# Patient Record
Sex: MALE | Race: WHITE | NOT HISPANIC OR LATINO | Employment: FULL TIME
[De-identification: names, ages, dates, MRNs, and addresses within clinical notes are randomized per-mention and may not be internally consistent; named-entity substitution may affect disease eponyms.]

---

## 2018-12-03 ENCOUNTER — TRANSCRIBE ORDERS (OUTPATIENT)
Dept: SCHEDULING | Age: 48
End: 2018-12-03

## 2018-12-03 DIAGNOSIS — N39.8 OTHER SPECIFIED DISORDERS OF URINARY SYSTEM: Primary | ICD-10-CM

## 2019-03-14 ENCOUNTER — TRANSCRIBE ORDERS (OUTPATIENT)
Dept: SCHEDULING | Age: 49
End: 2019-03-14

## 2019-03-14 DIAGNOSIS — M54.2 CERVICALGIA: ICD-10-CM

## 2019-03-14 DIAGNOSIS — M25.551 PAIN IN RIGHT HIP: Primary | ICD-10-CM

## 2019-03-14 DIAGNOSIS — M54.12 RADICULOPATHY OF CERVICAL REGION: ICD-10-CM

## 2019-03-18 ENCOUNTER — HOSPITAL ENCOUNTER (OUTPATIENT)
Dept: RADIOLOGY | Age: 49
Discharge: HOME | End: 2019-03-18
Attending: FAMILY MEDICINE
Payer: COMMERCIAL

## 2019-03-18 DIAGNOSIS — M54.2 CERVICALGIA: ICD-10-CM

## 2019-03-18 DIAGNOSIS — M25.551 PAIN IN RIGHT HIP: ICD-10-CM

## 2019-03-18 DIAGNOSIS — M54.12 RADICULOPATHY OF CERVICAL REGION: ICD-10-CM

## 2019-03-18 PROCEDURE — 73502 X-RAY EXAM HIP UNI 2-3 VIEWS: CPT | Mod: RT

## 2019-10-13 ENCOUNTER — HOSPITAL ENCOUNTER (EMERGENCY)
Facility: HOSPITAL | Age: 49
Discharge: HOME | End: 2019-10-13
Attending: EMERGENCY MEDICINE
Payer: COMMERCIAL

## 2019-10-13 ENCOUNTER — APPOINTMENT (EMERGENCY)
Dept: RADIOLOGY | Facility: HOSPITAL | Age: 49
End: 2019-10-13
Attending: EMERGENCY MEDICINE
Payer: COMMERCIAL

## 2019-10-13 VITALS
HEIGHT: 77 IN | TEMPERATURE: 98.3 F | WEIGHT: 195 LBS | BODY MASS INDEX: 23.02 KG/M2 | DIASTOLIC BLOOD PRESSURE: 77 MMHG | RESPIRATION RATE: 16 BRPM | HEART RATE: 76 BPM | SYSTOLIC BLOOD PRESSURE: 136 MMHG | OXYGEN SATURATION: 97 %
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
## 2019-10-13 DIAGNOSIS — R07.89 CHEST WALL PAIN: ICD-10-CM

## 2019-10-13 DIAGNOSIS — M79.632 LEFT FOREARM PAIN: ICD-10-CM

## 2019-10-13 PROCEDURE — 99283 EMERGENCY DEPT VISIT LOW MDM: CPT | Mod: 25

## 2019-10-13 PROCEDURE — 73080 X-RAY EXAM OF ELBOW: CPT | Mod: LT

## 2019-10-13 PROCEDURE — 73090 X-RAY EXAM OF FOREARM: CPT | Mod: LT

## 2019-10-13 PROCEDURE — 71101 X-RAY EXAM UNILAT RIBS/CHEST: CPT | Mod: LT

## 2019-10-13 ASSESSMENT — ENCOUNTER SYMPTOMS
NAUSEA: 0
FEVER: 0
FACIAL SWELLING: 0
ARTHRALGIAS: 1
EYE DISCHARGE: 0
MYALGIAS: 1
DIFFICULTY URINATING: 0
VOMITING: 0
EYE REDNESS: 0
NUMBNESS: 0
HEADACHES: 0
RHINORRHEA: 0
COUGH: 0
SHORTNESS OF BREATH: 0
DIARRHEA: 0

## 2019-10-13 NOTE — ED ATTESTATION NOTE
"-----------------------------------------------------------  ED Attending Note.  10/13/2019, 1:05 PM    I supervised care provided by the PA Benita). We have discussed the case. I have reviewed their note and agree with the plan of treatment. I personally interviewed the patient and examined the patient.    Sher Celaya is a 48 y.o. male with the following History reviewed. No pertinent past medical history., There is no problem list on file for this patient.   and History reviewed. No pertinent surgical history. who comes to the ED today with   Chief Complaint   Patient presents with   • Upper Extremity Issue     fall yesterday       PMH as above c/o     PHYSICAL EXAM  Visit Vitals  /77   Pulse 76   Temp 36.8 °C (98.3 °F) (Temporal)   Resp 16   Ht 1.956 m (6' 5\")   Wt 88.5 kg (195 lb)   SpO2 97%   BMI 23.12 kg/m²     Physical Exam    RESULTS: LABS, IMAGING, EKG  SpO2: 97 % on room air. Interpretation: normal  Labs Reviewed - No data to display  No orders to display            Records Reviewed: {reviewed:[id number]}    -----------------------------  Abigail Pretty MD  10/13/2019, 1:05 PM  -----------------------------------------------------------  "

## 2019-10-13 NOTE — ED PROVIDER NOTES
HPI     Chief Complaint   Patient presents with   • Upper Extremity Issue     fall yesterday       HPI   48 y.o. male with no significant past medical history, presents to the ED for evaluation of L arm pain s/p fall yesterday at 1800. Patient went to Urgent Care yesterday, where he was sutured over left eye. Patient reports increased left arm pain and L side rib pain. Rib pain with bending over, no pain with deep inspiration. Took Ibuprofen. No LOC or head injury. No changes in behavior, trouble ambulating, shoulder pain, fevers, nausea, vomiting, or abdominal pain.        Patient History     History reviewed. No pertinent past medical history.    History reviewed. No pertinent surgical history.    History reviewed. No pertinent family history.    Social History     Tobacco Use   • Smoking status: Never Smoker   • Smokeless tobacco: Never Used   Substance Use Topics   • Alcohol use: Yes   • Drug use: Never       Systems Reviewed from Nursing Triage:          Review of Systems     Review of Systems   Constitutional: Negative for fever.   HENT: Negative for congestion, facial swelling and rhinorrhea.    Eyes: Negative for discharge and redness.   Respiratory: Negative for cough and shortness of breath.    Cardiovascular: Negative for chest pain and leg swelling.   Gastrointestinal: Negative for diarrhea, nausea and vomiting.   Genitourinary: Negative for difficulty urinating.   Musculoskeletal: Positive for arthralgias (L arm) and myalgias (LUE).        Chest wall pain   Skin: Negative for rash.   Neurological: Negative for numbness and headaches.        Physical Exam     ED Triage Vitals [10/13/19 1151]   Temp Heart Rate Resp BP SpO2   36.8 °C (98.3 °F) 76 16 136/77 97 %      Temp Source Heart Rate Source Patient Position BP Location FiO2 (%) (Set)   Temporal -- -- -- --                     Patient Vitals for the past 24 hrs:   BP Temp Temp src Pulse Resp SpO2 Height Weight   10/13/19 1151 136/77 36.8 °C (98.3 °F)  "Temporal 76 16 97 % 1.956 m (6' 5\") 88.5 kg (195 lb)                                       Physical Exam   Constitutional: He is oriented to person, place, and time. He appears well-developed and well-nourished.   HENT:   Head: Normocephalic and atraumatic.   Eyes: Pupils are equal, round, and reactive to light. Conjunctivae and EOM are normal.   Neck: Neck supple.   Cardiovascular: Normal rate and regular rhythm.   No murmur heard.  Pulmonary/Chest: Effort normal and breath sounds normal. No respiratory distress. He exhibits tenderness (left anterior chest wall tenderness). He exhibits no laceration and no swelling.   Abdominal: Soft. There is no tenderness.   Musculoskeletal: Normal range of motion. He exhibits no edema.        Left elbow: He exhibits no deformity. Decreased range of motion: secondary to pain.   No paresthesias. Full ROM wrist. Tenderness over left radial head and left lateral forearm.   Neurological: He is alert and oriented to person, place, and time. He displays normal reflexes. No cranial nerve deficit or sensory deficit. He exhibits normal muscle tone. Coordination normal.   Skin: Skin is warm and dry. Capillary refill takes less than 2 seconds.   Psychiatric: He has a normal mood and affect.   Nursing note and vitals reviewed.           Procedures    ED Course & MDM     Labs Reviewed - No data to display    No orders to display               MDM         ED Course as of Oct 15 2304   Sun Oct 13, 2019   1449 Impression: tenderness over left radial head and lateral forearm, L anterior chest wall tenderness  P: XR, re-eval    [CN]   1545 IMPRESSION:  No acute bony abnormality in the left forearm and left elbow.   X-RAY ELBOW LEFT 3+ VIEWS [RH]   1605 IMPRESSION:  1.  No acute cardiopulmonary disease.  2.  No acute left rib fractures.   X-RAY RIBS LEFT WITH PA CHEST [RH]      ED Course User Index  [CN] Viviane Crawford  [RH] Makenna Gore PA C         Clinical Impressions as of Oct 15 2304 "   Fall, initial encounter   Left forearm pain   Chest wall pain          By signing my name below, I, Viviane Crawford, attest that this documentation has been prepared under the direction and in the presence of Makenna Gore PA-C.    10/13/2019 2:43 PM      I, Makenna Gore, personally performed the services described in this documentation, as documented by the scribe in my presence, and it is both accurate and complete.  10/15/2019 11:06 PM             Viviane Crawford  10/13/19 1530       Makenna Gore PA C  10/15/19 [id number]

## 2019-10-13 NOTE — DISCHARGE INSTRUCTIONS
Please call your primary care doctor today to inform them of your ER visit today and arrange a follow up appointment within 2 days. Please return immediately to the emergency department for any new/worsening or concerning symptoms.  You have been provided with copies of your x-ray imaging impression.  Please bring this with you to your follow-up appointment the primary care physician.

## 2019-10-13 NOTE — ED ATTESTATION NOTE
-----------------------------------------------------------  ED Attending Note.  10/13/2019, 1:05 PM    I supervised the care provided by the PA  (Bao). We have discussed the case. I have reviewed their note and am in agreement with the ED workup and treatment plan. I was available for consultation as needed.      Sher Celaya is a 48 y.o. male with the following History reviewed. No pertinent past medical history., There is no problem list on file for this patient.   and History reviewed. No pertinent surgical history. who comes to the ED today with   Chief Complaint   Patient presents with   • Upper Extremity Issue     fall yesterday       PMH as above c/o L arm pain that is worse since a fall yesterday. Went to an C yesterday after the fall, where L forehead lac was repaired. No LOC, N/V      RESULTS: LABS, IMAGING, EKG  Labs Reviewed - No data to display  X-RAY RIBS LEFT WITH PA CHEST   Final Result   IMPRESSION:   1.  No acute cardiopulmonary disease.   2.  No acute left rib fractures.         X-RAY ELBOW LEFT 3+ VIEWS   Final Result   IMPRESSION:  No acute bony abnormality in the left forearm and left elbow.      X-RAY FOREARM LEFT 2 VIEWS   Final Result   IMPRESSION:  No acute bony abnormality in the left forearm and left elbow.          ED Course as of Oct 13 1743   Sun Oct 13, 2019   1449 Impression: tenderness over left radial head and lateral forearm, L anterior chest wall tenderness  P: XR, re-eval    [CN]   1545 IMPRESSION:  No acute bony abnormality in the left forearm and left elbow.   X-RAY ELBOW LEFT 3+ VIEWS [RH]   1605 IMPRESSION:  1.  No acute cardiopulmonary disease.  2.  No acute left rib fractures.   X-RAY RIBS LEFT WITH PA CHEST [RH]      ED Course User Index  [CN] Viviane Crawford  [RH] Makenna Gore PA C         Clinical Impressions as of Oct 13 1743   Fall, initial encounter   Left forearm pain   Chest wall pain       -----------------------------  Abigail Pretty MD  10/13/2019 @  1:05 PM  -----------------------------------------------------------     Abigail Pretty MD  10/17/19 1901

## 2020-02-12 ENCOUNTER — APPOINTMENT (OUTPATIENT)
Dept: URBAN - METROPOLITAN AREA CLINIC 200 | Age: 50
Setting detail: DERMATOLOGY
End: 2020-02-26

## 2020-02-12 DIAGNOSIS — L82.1 OTHER SEBORRHEIC KERATOSIS: ICD-10-CM

## 2020-02-12 DIAGNOSIS — Z41.9 ENCOUNTER FOR PROCEDURE FOR PURPOSES OTHER THAN REMEDYING HEALTH STATE, UNSPECIFIED: ICD-10-CM

## 2020-02-12 DIAGNOSIS — L71.8 OTHER ROSACEA: ICD-10-CM

## 2020-02-12 DIAGNOSIS — L57.8 OTHER SKIN CHANGES DUE TO CHRONIC EXPOSURE TO NONIONIZING RADIATION: ICD-10-CM

## 2020-02-12 DIAGNOSIS — L20.84 INTRINSIC (ALLERGIC) ECZEMA: ICD-10-CM

## 2020-02-12 PROBLEM — L85.3 XEROSIS CUTIS: Status: ACTIVE | Noted: 2020-02-12

## 2020-02-12 PROCEDURE — OTHER PRESCRIPTION: OTHER

## 2020-02-12 PROCEDURE — OTHER REASSURANCE: OTHER

## 2020-02-12 PROCEDURE — OTHER LIQUID NITROGEN (COSMETIC): OTHER

## 2020-02-12 PROCEDURE — 99203 OFFICE O/P NEW LOW 30 MIN: CPT

## 2020-02-12 PROCEDURE — OTHER COUNSELING: OTHER

## 2020-02-12 RX ORDER — METRONIDAZOLE 10 MG/G
GEL TOPICAL
Qty: 1 | Refills: 6 | Status: ERX | COMMUNITY
Start: 2020-02-12

## 2020-02-12 RX ORDER — CLOBETASOL PROPIONATE 0.5 MG/G
CREAM TOPICAL
Qty: 1 | Refills: 3 | Status: ERX | COMMUNITY
Start: 2020-02-12

## 2020-02-12 ASSESSMENT — LOCATION DETAILED DESCRIPTION DERM
LOCATION DETAILED: LEFT MEDIAL SUPERIOR CHEST
LOCATION DETAILED: LEFT PROXIMAL PRETIBIAL REGION
LOCATION DETAILED: RIGHT SUPERIOR PARIETAL SCALP
LOCATION DETAILED: LEFT MEDIAL FRONTAL SCALP
LOCATION DETAILED: LEFT CENTRAL MALAR CHEEK
LOCATION DETAILED: LEFT SUPERIOR PARIETAL SCALP
LOCATION DETAILED: MEDIAL FRONTAL SCALP

## 2020-02-12 ASSESSMENT — LOCATION SIMPLE DESCRIPTION DERM
LOCATION SIMPLE: LEFT CHEEK
LOCATION SIMPLE: CHEST
LOCATION SIMPLE: LEFT SCALP
LOCATION SIMPLE: LEFT PRETIBIAL REGION
LOCATION SIMPLE: SCALP
LOCATION SIMPLE: SCALP
LOCATION SIMPLE: FRONTAL SCALP

## 2020-02-12 ASSESSMENT — LOCATION ZONE DERM
LOCATION ZONE: SCALP
LOCATION ZONE: SCALP
LOCATION ZONE: TRUNK
LOCATION ZONE: FACE
LOCATION ZONE: LEG

## 2020-02-12 NOTE — PROCEDURE: LIQUID NITROGEN (COSMETIC)
Detail Level: Simple
Price (Use Numbers Only, No Special Characters Or $): 50.00
Consent: The patient's consent was obtained including but not limited to risks of crusting, scabbing, blistering, scarring, darker or lighter pigmentary change, recurrence, incomplete removal and infection. The patient understands that the procedure is cosmetic in nature and is not covered by insurance.
Render Post-Care Instructions In Note?: no
Post-Care Instructions: I reviewed with the patient in detail post-care instructions. Patient is to wear sunprotection, and avoid picking at any of the treated lesions. Pt may apply Vaseline to crusted or scabbing areas.

## 2021-04-14 ENCOUNTER — APPOINTMENT (OUTPATIENT)
Dept: URBAN - METROPOLITAN AREA CLINIC 200 | Age: 51
Setting detail: DERMATOLOGY
End: 2021-04-25

## 2021-04-14 DIAGNOSIS — L57.8 OTHER SKIN CHANGES DUE TO CHRONIC EXPOSURE TO NONIONIZING RADIATION: ICD-10-CM

## 2021-04-14 DIAGNOSIS — L71.8 OTHER ROSACEA: ICD-10-CM

## 2021-04-14 DIAGNOSIS — Z87.2 PERSONAL HISTORY OF DISEASES OF THE SKIN AND SUBCUTANEOUS TISSUE: ICD-10-CM

## 2021-04-14 PROCEDURE — OTHER PRESCRIPTION MEDICATION MANAGEMENT: OTHER

## 2021-04-14 PROCEDURE — OTHER REASSURANCE: OTHER

## 2021-04-14 PROCEDURE — 99213 OFFICE O/P EST LOW 20 MIN: CPT

## 2021-04-14 PROCEDURE — OTHER COUNSELING: OTHER

## 2021-04-14 ASSESSMENT — LOCATION DETAILED DESCRIPTION DERM
LOCATION DETAILED: LEFT MEDIAL SUPERIOR CHEST
LOCATION DETAILED: RIGHT INFERIOR CENTRAL MALAR CHEEK
LOCATION DETAILED: LEFT INFERIOR CENTRAL MALAR CHEEK

## 2021-04-14 ASSESSMENT — LOCATION SIMPLE DESCRIPTION DERM
LOCATION SIMPLE: LEFT CHEEK
LOCATION SIMPLE: CHEST
LOCATION SIMPLE: RIGHT CHEEK

## 2021-04-14 ASSESSMENT — LOCATION ZONE DERM
LOCATION ZONE: FACE
LOCATION ZONE: TRUNK

## 2021-08-31 ENCOUNTER — TRANSCRIBE ORDERS (OUTPATIENT)
Dept: SCHEDULING | Age: 51
End: 2021-08-31

## 2021-08-31 DIAGNOSIS — R51.9 HEADACHE, UNSPECIFIED: Primary | ICD-10-CM

## 2021-09-04 ENCOUNTER — HOSPITAL ENCOUNTER (OUTPATIENT)
Dept: RADIOLOGY | Age: 51
Discharge: HOME | End: 2021-09-04
Attending: FAMILY MEDICINE
Payer: COMMERCIAL

## 2021-09-04 DIAGNOSIS — R51.9 HEADACHE, UNSPECIFIED: ICD-10-CM

## 2021-11-12 ENCOUNTER — TRANSCRIBE ORDERS (OUTPATIENT)
Dept: SCHEDULING | Age: 51
End: 2021-11-12
Payer: COMMERCIAL

## 2021-11-12 DIAGNOSIS — S92.014A NONDISPLACED FRACTURE OF BODY OF RIGHT CALCANEUS, INITIAL ENCOUNTER FOR CLOSED FRACTURE: Primary | ICD-10-CM

## 2021-11-19 ENCOUNTER — HOSPITAL ENCOUNTER (OUTPATIENT)
Dept: RADIOLOGY | Age: 51
Discharge: HOME | End: 2021-11-19
Attending: PODIATRIST
Payer: COMMERCIAL

## 2021-11-19 DIAGNOSIS — S92.014A NONDISPLACED FRACTURE OF BODY OF RIGHT CALCANEUS, INITIAL ENCOUNTER FOR CLOSED FRACTURE: ICD-10-CM

## 2021-12-15 ENCOUNTER — TRANSCRIBE ORDERS (OUTPATIENT)
Dept: SCHEDULING | Age: 51
End: 2021-12-15
Payer: COMMERCIAL

## 2021-12-15 DIAGNOSIS — Z11.59 ENCOUNTER FOR SCREENING FOR OTHER VIRAL DISEASES: Primary | ICD-10-CM

## 2022-02-09 ENCOUNTER — APPOINTMENT (OUTPATIENT)
Dept: LAB | Age: 52
End: 2022-02-09
Attending: PODIATRIST
Payer: COMMERCIAL

## 2022-02-09 DIAGNOSIS — Z11.59 ENCOUNTER FOR SCREENING FOR OTHER VIRAL DISEASES: ICD-10-CM

## 2022-02-09 LAB — SARS-COV-2 RNA RESP QL NAA+PROBE: NEGATIVE

## 2022-02-09 PROCEDURE — U0003 INFECTIOUS AGENT DETECTION BY NUCLEIC ACID (DNA OR RNA); SEVERE ACUTE RESPIRATORY SYNDROME CORONAVIRUS 2 (SARS-COV-2) (CORONAVIRUS DISEASE [COVID-19]), AMPLIFIED PROBE TECHNIQUE, MAKING USE OF HIGH THROUGHPUT TECHNOLOGIES AS DESCRIBED BY CMS-2020-01-R: HCPCS

## 2022-02-09 PROCEDURE — C9803 HOPD COVID-19 SPEC COLLECT: HCPCS

## 2022-02-10 ENCOUNTER — APPOINTMENT (OUTPATIENT)
Dept: PREADMISSION TESTING | Facility: HOSPITAL | Age: 52
End: 2022-02-10
Payer: COMMERCIAL

## 2022-02-11 ENCOUNTER — ANESTHESIA EVENT (OUTPATIENT)
Dept: OPERATING ROOM | Facility: HOSPITAL | Age: 52
Setting detail: HOSPITAL OUTPATIENT SURGERY
End: 2022-02-11
Payer: COMMERCIAL

## 2022-02-11 VITALS — HEIGHT: 77 IN | BODY MASS INDEX: 23.02 KG/M2 | WEIGHT: 195 LBS

## 2022-02-11 ASSESSMENT — PAIN SCALES - GENERAL: PAINLEVEL: 2

## 2022-02-11 NOTE — PRE-PROCEDURE INSTRUCTIONS
1. Admissions will call you with your arrival time on 02/11/2022 (day prior to surgery) between 2pm - 4pm. For questions about your arrival time, please call [phone].    2. Please report to the [facility] in the [place] on the day of your procedure. Enter the hospital through the [facility] (main entrance at [address], [place]). If you are parking in the [facility], come to the ground floor of the garage and follow signs to the Main Hospital. Bring your insurance card and photo ID.     3. Please follow these fasting guidelines:  - STOP all solid food 8 hours prior to arrival.   - No more than 12oz of water is permitted and must STOP 2 HOURS prior to arrival to the hospital.     4. Early on the morning of the procedure, please take the following medications listed below with a sip of water, in addition to any medications prescribed by your surgeon:     *NO aspirin, ibuprofen, anti-inflammatories, fish oil or Vitamin E unless ordered by physician.    *Stop taking      5. Other instructions: antibacterial shower x 2, brush teeth    6. If you develop a cough, cold, fever, or other symptom prior to the date of the procedure, please report it to your physician immediately.    7. If you need to cancel the procedure for any reason, please contact your physician.    8. Make arrangements to have someone drive you home from the procedure. If you have not arranged for transportation home, your surgery may be cancelled.     9. You may not take public transportation or a cab unless accompanied by a responsible person.    10. You may not drive a car or operate complex or potentially dangerous machinery for 24 hours following anesthesia and/or sedation.    11. If it is medically necessary for you to have a longer stay, you will be informed as soon as the decision is made.    12. Do not wear or bring anything of value to the hospital, including medication or jewelry of any kind. Do  not wear make-up or contact lenses. Do bring your glasses and hearing aid, with a case. If you use a CPAP machine and will be overnight, please bring it with you. If you use any inhalers, please bring them as well.     13. Dress in comfortable clothes.    14. If instructed, please bring a copy of your Advance Directive (Living Will/Durable Power of ) on the day of your procedure.    15. Ensuring your safety at all times is a very important part of our [facility] Culture of Safety. After having surgery and sedation, you are at risk for falling and balance issues. Although you may feel awake, the effects of the medication can last up to 24 hours after anesthesia. If you need to use the bathroom during your recovery period, nursing staff will escort you there and stay with you to ensure your safety.    Pre operative instructions given as per protocol.  Form explained by:

## 2022-02-11 NOTE — ANESTHESIOLOGIST PRE-PROCEDURE CHART REVIEW
I confirm that I have reviewed the available information in the medical record prior to the scheduled surgery. Needs labs, EKG on admission

## 2022-02-14 ENCOUNTER — HOSPITAL ENCOUNTER (OUTPATIENT)
Facility: HOSPITAL | Age: 52
Setting detail: HOSPITAL OUTPATIENT SURGERY
Discharge: HOME | End: 2022-02-14
Attending: PODIATRIST | Admitting: PODIATRIST
Payer: COMMERCIAL

## 2022-02-14 ENCOUNTER — ANESTHESIA (OUTPATIENT)
Dept: OPERATING ROOM | Facility: HOSPITAL | Age: 52
Setting detail: HOSPITAL OUTPATIENT SURGERY
End: 2022-02-14
Payer: COMMERCIAL

## 2022-02-14 VITALS
BODY MASS INDEX: 23.02 KG/M2 | HEART RATE: 61 BPM | TEMPERATURE: 98 F | WEIGHT: 195 LBS | DIASTOLIC BLOOD PRESSURE: 72 MMHG | SYSTOLIC BLOOD PRESSURE: 122 MMHG | HEIGHT: 77 IN | RESPIRATION RATE: 14 BRPM | OXYGEN SATURATION: 97 %

## 2022-02-14 DIAGNOSIS — M77.31 HEEL SPUR, RIGHT: ICD-10-CM

## 2022-02-14 DIAGNOSIS — M67.873 OTHER SPECIFIED DISORDERS OF TENDON, RIGHT ANKLE AND FOOT: ICD-10-CM

## 2022-02-14 PROCEDURE — 88311 DECALCIFY TISSUE: CPT | Performed by: PODIATRIST

## 2022-02-14 PROCEDURE — 27200000 HC STERILE SUPPLY: Performed by: PODIATRIST

## 2022-02-14 PROCEDURE — 37000001 HC ANESTHESIA GENERAL: Performed by: PODIATRIST

## 2022-02-14 PROCEDURE — 25800000 HC PHARMACY IV SOLUTIONS: Performed by: ANESTHESIOLOGY

## 2022-02-14 PROCEDURE — 36000013 HC OR LEVEL 3 EA ADDL MIN: Performed by: PODIATRIST

## 2022-02-14 PROCEDURE — 25000000 HC PHARMACY GENERAL: Performed by: PODIATRIST

## 2022-02-14 PROCEDURE — 0QBL0ZZ EXCISION OF RIGHT TARSAL, OPEN APPROACH: ICD-10-PCS | Performed by: PODIATRIST

## 2022-02-14 PROCEDURE — 0LQN0ZZ REPAIR RIGHT LOWER LEG TENDON, OPEN APPROACH: ICD-10-PCS | Performed by: PODIATRIST

## 2022-02-14 PROCEDURE — 0L8N0ZZ DIVISION OF RIGHT LOWER LEG TENDON, OPEN APPROACH: ICD-10-PCS | Performed by: PODIATRIST

## 2022-02-14 PROCEDURE — 63700000 HC SELF-ADMINISTRABLE DRUG: Performed by: PODIATRIST

## 2022-02-14 PROCEDURE — 0QCL0ZZ EXTIRPATION OF MATTER FROM RIGHT TARSAL, OPEN APPROACH: ICD-10-PCS | Performed by: PODIATRIST

## 2022-02-14 PROCEDURE — 71000011 HC PACU PHASE 1 EA ADDL MIN: Performed by: PODIATRIST

## 2022-02-14 PROCEDURE — 36000003 HC OR LEVEL 3 INITIAL 30MIN: Performed by: PODIATRIST

## 2022-02-14 PROCEDURE — 63600000 HC DRUGS/DETAIL CODE: Performed by: ANESTHESIOLOGY

## 2022-02-14 PROCEDURE — 25000000 HC PHARMACY GENERAL: Performed by: ANESTHESIOLOGY

## 2022-02-14 PROCEDURE — 71000002 HC PACU PHASE 2 INITIAL 30MIN: Performed by: PODIATRIST

## 2022-02-14 PROCEDURE — 63700000 HC SELF-ADMINISTRABLE DRUG: Performed by: ANESTHESIOLOGY

## 2022-02-14 PROCEDURE — C1713 ANCHOR/SCREW BN/BN,TIS/BN: HCPCS | Performed by: PODIATRIST

## 2022-02-14 PROCEDURE — 71000001 HC PACU PHASE 1 INITIAL 30MIN: Performed by: PODIATRIST

## 2022-02-14 DEVICE — IMP SYSTEM BIOC ACHILLES SPEEDBRIDGE W/JUMP: Type: IMPLANTABLE DEVICE | Site: ACHILLES TENDON | Status: FUNCTIONAL

## 2022-02-14 RX ORDER — SODIUM CHLORIDE, SODIUM GLUCONATE, SODIUM ACETATE, POTASSIUM CHLORIDE AND MAGNESIUM CHLORIDE 30; 37; 368; 526; 502 MG/100ML; MG/100ML; MG/100ML; MG/100ML; MG/100ML
INJECTION, SOLUTION INTRAVENOUS CONTINUOUS PRN
Status: DISCONTINUED | OUTPATIENT
Start: 2022-02-14 | End: 2022-02-14

## 2022-02-14 RX ORDER — FENTANYL CITRATE 50 UG/ML
50 INJECTION, SOLUTION INTRAMUSCULAR; INTRAVENOUS
Status: DISCONTINUED | OUTPATIENT
Start: 2022-02-14 | End: 2022-02-14 | Stop reason: HOSPADM

## 2022-02-14 RX ORDER — ONDANSETRON HYDROCHLORIDE 2 MG/ML
4 INJECTION, SOLUTION INTRAVENOUS
Status: DISCONTINUED | OUTPATIENT
Start: 2022-02-14 | End: 2022-02-14 | Stop reason: HOSPADM

## 2022-02-14 RX ORDER — SODIUM CHLORIDE, SODIUM GLUCONATE, SODIUM ACETATE, POTASSIUM CHLORIDE AND MAGNESIUM CHLORIDE 30; 37; 368; 526; 502 MG/100ML; MG/100ML; MG/100ML; MG/100ML; MG/100ML
40 INJECTION, SOLUTION INTRAVENOUS CONTINUOUS
Status: DISCONTINUED | OUTPATIENT
Start: 2022-02-14 | End: 2022-02-14 | Stop reason: HOSPADM

## 2022-02-14 RX ORDER — ROCURONIUM BROMIDE 10 MG/ML
INJECTION, SOLUTION INTRAVENOUS AS NEEDED
Status: DISCONTINUED | OUTPATIENT
Start: 2022-02-14 | End: 2022-02-15 | Stop reason: SURG

## 2022-02-14 RX ORDER — SCOPOLAMINE 1 MG/3D
1 PATCH, EXTENDED RELEASE TRANSDERMAL ONCE
Status: DISCONTINUED | OUTPATIENT
Start: 2022-02-14 | End: 2022-02-14 | Stop reason: HOSPADM

## 2022-02-14 RX ORDER — BUPIVACAINE HYDROCHLORIDE 5 MG/ML
INJECTION, SOLUTION EPIDURAL; INTRACAUDAL
Status: DISCONTINUED | OUTPATIENT
Start: 2022-02-14 | End: 2022-02-14 | Stop reason: HOSPADM

## 2022-02-14 RX ORDER — LIDOCAINE HYDROCHLORIDE 10 MG/ML
INJECTION, SOLUTION INFILTRATION; PERINEURAL AS NEEDED
Status: DISCONTINUED | OUTPATIENT
Start: 2022-02-14 | End: 2022-02-15 | Stop reason: SURG

## 2022-02-14 RX ORDER — OXYCODONE AND ACETAMINOPHEN 5; 325 MG/1; MG/1
1 TABLET ORAL ONCE
Status: COMPLETED | OUTPATIENT
Start: 2022-02-14 | End: 2022-02-14

## 2022-02-14 RX ORDER — SODIUM CHLORIDE 9 MG/ML
1000 INJECTION, SOLUTION INTRAVENOUS CONTINUOUS
Status: DISCONTINUED | OUTPATIENT
Start: 2022-02-14 | End: 2022-02-14

## 2022-02-14 RX ORDER — DEXAMETHASONE SODIUM PHOSPHATE 4 MG/ML
INJECTION, SOLUTION INTRA-ARTICULAR; INTRALESIONAL; INTRAMUSCULAR; INTRAVENOUS; SOFT TISSUE AS NEEDED
Status: DISCONTINUED | OUTPATIENT
Start: 2022-02-14 | End: 2022-02-15 | Stop reason: SURG

## 2022-02-14 RX ORDER — DIPHENHYDRAMINE HCL 50 MG/ML
12.5 VIAL (ML) INJECTION
Status: DISCONTINUED | OUTPATIENT
Start: 2022-02-14 | End: 2022-02-14 | Stop reason: HOSPADM

## 2022-02-14 RX ORDER — MEPERIDINE HYDROCHLORIDE 25 MG/ML
12.5 INJECTION INTRAMUSCULAR; INTRAVENOUS; SUBCUTANEOUS EVERY 10 MIN PRN
Status: DISCONTINUED | OUTPATIENT
Start: 2022-02-14 | End: 2022-02-14 | Stop reason: HOSPADM

## 2022-02-14 RX ORDER — OXYCODONE AND ACETAMINOPHEN 5; 325 MG/1; MG/1
TABLET ORAL
Status: DISCONTINUED
Start: 2022-02-14 | End: 2022-02-14 | Stop reason: HOSPADM

## 2022-02-14 RX ORDER — HYDROMORPHONE HYDROCHLORIDE 1 MG/ML
0.5 INJECTION, SOLUTION INTRAMUSCULAR; INTRAVENOUS; SUBCUTANEOUS
Status: DISCONTINUED | OUTPATIENT
Start: 2022-02-14 | End: 2022-02-14 | Stop reason: HOSPADM

## 2022-02-14 RX ORDER — PROPOFOL 10 MG/ML
INJECTION, EMULSION INTRAVENOUS AS NEEDED
Status: DISCONTINUED | OUTPATIENT
Start: 2022-02-14 | End: 2022-02-15 | Stop reason: SURG

## 2022-02-14 RX ORDER — ONDANSETRON HYDROCHLORIDE 2 MG/ML
INJECTION, SOLUTION INTRAVENOUS AS NEEDED
Status: DISCONTINUED | OUTPATIENT
Start: 2022-02-14 | End: 2022-02-15 | Stop reason: SURG

## 2022-02-14 RX ORDER — CYCLOBENZAPRINE HCL 5 MG
5 TABLET ORAL EVERY 8 HOURS PRN
Qty: 40 TABLET | Refills: 0 | Status: SHIPPED | OUTPATIENT
Start: 2022-02-14 | End: 2022-02-28

## 2022-02-14 RX ORDER — SCOPOLAMINE 1 MG/3D
PATCH, EXTENDED RELEASE TRANSDERMAL
Status: DISCONTINUED
Start: 2022-02-14 | End: 2022-02-14 | Stop reason: HOSPADM

## 2022-02-14 RX ORDER — OXYCODONE AND ACETAMINOPHEN 5; 325 MG/1; MG/1
1 TABLET ORAL EVERY 4 HOURS PRN
Qty: 40 TABLET | Refills: 0 | Status: SHIPPED | OUTPATIENT
Start: 2022-02-14 | End: 2022-02-19

## 2022-02-14 RX ORDER — MIDAZOLAM HYDROCHLORIDE 2 MG/2ML
INJECTION, SOLUTION INTRAMUSCULAR; INTRAVENOUS AS NEEDED
Status: DISCONTINUED | OUTPATIENT
Start: 2022-02-14 | End: 2022-02-15 | Stop reason: SURG

## 2022-02-14 RX ORDER — CEFAZOLIN SODIUM 2 G/100ML
INJECTION, SOLUTION INTRAVENOUS AS NEEDED
Status: DISCONTINUED | OUTPATIENT
Start: 2022-02-14 | End: 2022-02-15 | Stop reason: SURG

## 2022-02-14 RX ORDER — FENTANYL CITRATE 50 UG/ML
INJECTION, SOLUTION INTRAMUSCULAR; INTRAVENOUS AS NEEDED
Status: DISCONTINUED | OUTPATIENT
Start: 2022-02-14 | End: 2022-02-15 | Stop reason: SURG

## 2022-02-14 RX ADMIN — CEFAZOLIN SODIUM 2 G: 2 INJECTION, SOLUTION INTRAVENOUS at 11:08

## 2022-02-14 RX ADMIN — FENTANYL CITRATE 50 MCG: 50 INJECTION, SOLUTION INTRAMUSCULAR; INTRAVENOUS at 11:12

## 2022-02-14 RX ADMIN — PROPOFOL 200 MG: 10 INJECTION, EMULSION INTRAVENOUS at 11:12

## 2022-02-14 RX ADMIN — FENTANYL CITRATE 50 MCG: 50 INJECTION, SOLUTION INTRAMUSCULAR; INTRAVENOUS at 13:49

## 2022-02-14 RX ADMIN — FENTANYL CITRATE 25 MCG: 50 INJECTION, SOLUTION INTRAMUSCULAR; INTRAVENOUS at 11:21

## 2022-02-14 RX ADMIN — SCOPALAMINE 1 PATCH: 1 PATCH, EXTENDED RELEASE TRANSDERMAL at 10:15

## 2022-02-14 RX ADMIN — FENTANYL CITRATE 50 MCG: 50 INJECTION, SOLUTION INTRAMUSCULAR; INTRAVENOUS at 13:07

## 2022-02-14 RX ADMIN — SODIUM CHLORIDE 1000 ML: 9 INJECTION, SOLUTION INTRAVENOUS at 10:15

## 2022-02-14 RX ADMIN — FENTANYL CITRATE 50 MCG: 50 INJECTION, SOLUTION INTRAMUSCULAR; INTRAVENOUS at 12:08

## 2022-02-14 RX ADMIN — ROCURONIUM BROMIDE 60 MG: 10 INJECTION, SOLUTION INTRAVENOUS at 11:13

## 2022-02-14 RX ADMIN — DEXAMETHASONE SODIUM PHOSPHATE 8 MG: 4 INJECTION, SOLUTION INTRA-ARTICULAR; INTRALESIONAL; INTRAMUSCULAR; INTRAVENOUS; SOFT TISSUE at 11:24

## 2022-02-14 RX ADMIN — SODIUM CHLORIDE: 9 INJECTION, SOLUTION INTRAVENOUS at 11:08

## 2022-02-14 RX ADMIN — LIDOCAINE HYDROCHLORIDE 5 ML: 10 INJECTION, SOLUTION INFILTRATION; PERINEURAL at 11:12

## 2022-02-14 RX ADMIN — MIDAZOLAM HYDROCHLORIDE 2 MG: 1 INJECTION, SOLUTION INTRAMUSCULAR; INTRAVENOUS at 11:07

## 2022-02-14 RX ADMIN — SODIUM CHLORIDE, SODIUM GLUCONATE, SODIUM ACETATE, POTASSIUM CHLORIDE AND MAGNESIUM CHLORIDE: 526; 502; 368; 37; 30 INJECTION, SOLUTION INTRAVENOUS at 13:43

## 2022-02-14 RX ADMIN — OXYCODONE HYDROCHLORIDE AND ACETAMINOPHEN 1 TABLET: 5; 325 TABLET ORAL at 15:20

## 2022-02-14 RX ADMIN — ONDANSETRON 4 MG: 2 INJECTION INTRAMUSCULAR; INTRAVENOUS at 13:29

## 2022-02-14 ASSESSMENT — ENCOUNTER SYMPTOMS: HEADACHES: 1

## 2022-02-14 NOTE — ANESTHESIA PROCEDURE NOTES
Airway  Urgency: elective    Start Time: 2/14/2022 11:19 AM  Airway not difficult    General Information and Staff    Patient location during procedure: OR  Anesthesiologist: Lita Kwok MD  Performed: anesthesiologist     Indications and Patient Condition  Indications for airway management: anesthesia  Sedation level: general  Preoxygenated: yes  Patient position: sniffing  Mask difficulty assessment: 1 - vent by mask    Final Airway Details  Final airway type: endotracheal airway      Successful airway: ETT  Cuffed: yes   Successful intubation technique: direct laryngoscopy  Endotracheal tube insertion site: oral  Blade: Rosario  Blade size: #4  ETT size (mm): 8.0  Cormack-Lehane Classification: grade I - full view of glottis  Placement verified by: chest auscultation and capnometry   Cuff volume (mL): 10  Measured from: lips  ETT to lips (cm): 24  Number of attempts at approach: 1  Number of other approaches attempted: 0  Atraumatic airway insertion

## 2022-02-14 NOTE — OR SURGEON
Pre-Procedure patient identification:  I am the primary operating surgeon/proceduralist and I have identified the patient and confirmed laterality is right on 02/14/22 at 11:05 AM Adam Espitia DPM  Phone Number: [phone]

## 2022-02-14 NOTE — ANESTHESIOLOGIST PRE-PROCEDURE ATTESTATION
Pre-Procedure Patient Identification:  I am the Primary Anesthesiologist and have identified the patient on 02/14/22 at 10:09 AM.   I have confirmed the procedure(s) will be performed by the following surgeon/proceduralist Adam Espitia DPM.

## 2022-02-14 NOTE — OP NOTE
Hospital: [facility]  Medical Record Number:  [id number]  Patient Name:  Sher Celaya  YOB: 1970   Date of Operation: 2/14/2022  Primary Surgeon:  Adam Espitia DPM   First Assistant: Patrick Onofre DPM    Preoperative Diagnosis:  Right insertional achilles tendonitis, retrocalcaneal osteophyte with equinus deformity    Postoperative Diagnosis: As above    Procedure: Right gastrocnemius recession, right heel spur resection, achilles tendon repair with detachment and reattachment    Anesthesia: General + local (30cc 0.5% marcaine plaine)    Hemostasis: Thigh tourniquet @300mmHg    Operative Findings: none    Estimated Blood Loss: 0 mL    Specimens: Right retrocalcaneal osteophyte to pathology, right calcific achilles tendon to pathology     Implants:   Implant Name Type Inv. Item Serial No.  Lot No. LRB No. Used Action   IMP SYSTEM BIOC ACHILLES SPEEDBRIDGE W/JUMP - [id number]  IMP SYSTEM BIOC ACHILLES SPEEDBRIDGE W/JUMP  [id number] Right 1 Implanted       Injectables: None          Complications:  None; patient tolerated the procedure well.           Disposition: PACU - hemodynamically stable.           Condition: stable    Operative Note:   Mr. Celaya  presents to [facility] for surgical intervention of their Right insertional achilles tendinopathy, retrocalcaneal osteophyte  . The patient has failed most conservative modalities and has opted for surgical intervention at this time. In pre-op the patients’ vital signs are stable and neurovascular status is intact.     The patient was transferred to the operating room and placed on the operating room table in the prone position.  The correct surgical site was identified which is the right ankle. A well padded pneumatic tourniquet was then placed around the right thigh but not elevated at this time. Once anesthesia was achieved, the above mentioned blocked was administered proximal to the surgical site. The  foot, ankle and leg were prepped and draped in the usual aseptic technique and a timeout was performed before the beginning of the procedure. An Eshmarch bandage was then wrapped around the foot, ankle and leg and then the tourniquet was then elevated to 300 mmHg.    Attention was then brought to the right posterior leg. Using #15 blade, a straight linear incision was made through superficial skin and blunt dissection was carried through the soft tissue distal to the myotendinous junction.  Care was taken to avoid all neurovascular structures.  The incision was made slightly medial to avoid the sural nerve as well as saphenous vein.  Dissection was carried out down to the deep fascia.  A longitudinal incision was made through the deep fascia and dissection was carried down to the midsubstance of the gastrocnemius aponeurosis. Small vascular structures were ligated and all major neurovascular structures were retracted from the field. At this time the correct level of the gastroc aponeurosis was confirmed and the overlying paratenon was incised with #15 blade. After paratenon was retracted, a straight horizontal incision was made through aponeurosis with #15 blade, ensuring deeper gastrocnemius muscle fibers were left intact. The ankle was taken into dorsiflexion and the resulting aponeurosis gap was noted to be approximately 2.5-3.0cm gained in length. Operative site was copiously irrigated with 100mL NSS using bulb syringe. Paratenon and deep fascial layer was reapproximated with 3-0 vicryl.  Subcutaneous tissue soft tissue layers were reapproximated with 3-0 monocril. Superficial soft tissue layers were reapproximated with staples.     Attention was then brought to the right posterior heel. Using #15 blade, a straight linear incision was made through superficial soft tissue about the posterior aspect of the calcaneus, about the previously noted significant bony prominence.  All bleeding vessels were ligated and all  major neurovascular structures were retracted from the field. Insertional fibers of the achilles tendon were identified. Using #15 blade, the achilles tendon was incised and reflected from the lateral and central aspects of the posterior calcaneus, ensuring medial fibers were left intact. Using sagittal saw and bone rasps, bony prominences were removed from the posterior calcaneus.  This includes the insertion of the Achilles as well as the posterior superior eminence of the calcaneus.  Several pieces of the removed eminence were sent to pathology. With fluoroscopic assistance, excellent reduction of retrocalcaneal bony prominence and was confirmed and normal calcaneal morphology had been restored. At this time, a small calcific body within the achilles tendon was identified and excised with #15 blade. This calcific body was sent to pathology. Remaining posterior calcaneal body was rasped and achilles tendon was gently debrided to promote healing.  Remaining calcaneus had controlled bleeding and healthy bone.  All nonviable Achilles tendon hypertrophic changes was resected to a more normal tendon.  Fluoroscopic guidance was completed to confirm the bone resection and to confirm an anatomic posterior calcaneus.  At this time, Speed Bridge construct site was planned to ensure excellent re-approximation of the achilles tendon.  Drill holes were made in the appropriate posterior aspect of the calcaneus.  The Speed Bridge sutures were placed grasping an appropriate amount of the Achilles tendon.  The sutures were then anchored into the posterior aspect of the calcaneus using the speed bridge anchors/absorbable screws.  Further sutures were used to adjunct the repair that came with the suture anchors.  Construct was tested and confirmed to be very rigid and anatomic relationships were confirmed to have been restored. Operative site was copiously irrigated with 100mL NSS using bulb syringe. Deep soft tissue layers were  reapproximated with 3-0 vicryl. Superficial soft tissue layers were reapproximated with 3-0 Monocril and 4-0 Prolene.  Tourniquet was let down and a brisk hyperemic response was noted to all digits. Operative sites were dressed with xeroform, DSD, kerlix. And a below the knee fiberglass cast was applied ensuring the ankle was in plantarflexion.       The patient tolerated the procedure well with all vital signs stable and neurovascular status intact. Once aroused from anesthesia the patient was transferred from the operating room to PACU and discharged home per protocol.    Patient is instructed to be non-weightbearing and to follow-up with Dr. Espitia  in 1 week after the procedure.     Adam Espitia DPM  Phone Number: [phone]

## 2022-02-14 NOTE — DISCHARGE INSTRUCTIONS
[facility]  [place]    [address], [place] [address]  [phone]    Dr. Adam Shields      Post Operative Instructions    Keep foot elevated as much as possible above the level of the heart.    Keep bandage clean and dry.  Do not remove unless instructed to do so by your Physician.  If bandage becomes soiled or wet call the office immediately.    Apply ice pack to right leg for 20 minutes 3-5 times a day unless otherwise advised.    Take medication as prescribed:    Percocet 5/325mg, take 1-2 tabs every 4-6 hours as needed for pain.    Flexeril 5mg, take 1 tab every 8 hours as needed for cramping    Xarelto 10mg, take 1 tab every day for 30 days to prevent blood clots     Bear weight on the area only as advised:  Non-weight bearing to right leg with use of crutches, walker, or wheelchair    Should you have excessive bleeding (through the bandage) or excessive discomfort, please call the office immediately.    Please call the office number above: to set up your 1st follow up appointment.

## 2022-02-15 NOTE — ANESTHESIA POSTPROCEDURE EVALUATION
Patient: Sher Celaya    Procedure Summary     Date: 02/14/22 Room / Location: [facility] OR  / [facility] OR [facility]    Anesthesia Start: 1108 Anesthesia Stop: 1423    Procedures:       Right Achilles Repair, Gastroc Recession (Right Ankle)      Right Heel Spur Resection with Achilles Repair (Right Ankle) Diagnosis:       Other specified disorders of tendon, right ankle and foot      Heel spur, right      (Other specified disorders of tendon, right ankle and foot [M67.873])      (Heel spur, right [M77.31])    Surgeons: Adam Espitia DPM Responsible Provider: Lita Kwok MD    Anesthesia Type: general ASA Status: 2          Anesthesia Type: general  PACU Vitals  2/14/2022 1420 - 2/14/2022 1520      2/14/2022  1425 2/14/2022  1430 2/14/2022  1445 2/14/2022  1500    BP: -- 136/84 130/79 131/79    Temp: 36.6 °C (97.9 °F) -- -- 36.6 °C (97.8 °F)    Pulse: -- 61 61 61    Resp: -- 20 20 12    SpO2: -- 100 % 100 % 100 %              2/14/2022  1513             BP: 122/72       Temp: 36.7 °C (98 °F)       Pulse: 61       Resp: 14       SpO2: 97 %               Anesthesia Post Evaluation    Pain management: adequate  Patient participation: complete - patient participated  Level of consciousness: awake and alert  Cardiovascular status: acceptable  Airway Patency: adequate  Respiratory status: acceptable  Hydration status: acceptable  Anesthetic complications: no

## 2022-02-17 LAB
CASE RPRT: NORMAL
CLINICAL INFO: NORMAL
PATH REPORT.FINAL DX SPEC: NORMAL
PATH REPORT.GROSS SPEC: NORMAL

## 2022-09-01 ENCOUNTER — APPOINTMENT (OUTPATIENT)
Dept: URBAN - METROPOLITAN AREA CLINIC 203 | Age: 52
Setting detail: DERMATOLOGY
End: 2022-09-02

## 2022-09-01 DIAGNOSIS — L57.8 OTHER SKIN CHANGES DUE TO CHRONIC EXPOSURE TO NONIONIZING RADIATION: ICD-10-CM

## 2022-09-01 DIAGNOSIS — Z11.52 ENCOUNTER FOR SCREENING FOR COVID-19: ICD-10-CM

## 2022-09-01 DIAGNOSIS — L64.8 OTHER ANDROGENIC ALOPECIA: ICD-10-CM

## 2022-09-01 PROCEDURE — 99214 OFFICE O/P EST MOD 30 MIN: CPT

## 2022-09-01 PROCEDURE — OTHER PRESCRIPTION: OTHER

## 2022-09-01 PROCEDURE — OTHER PRESCRIPTION MEDICATION MANAGEMENT: OTHER

## 2022-09-01 PROCEDURE — OTHER PHOTO-DOCUMENTATION: OTHER

## 2022-09-01 PROCEDURE — OTHER SUNSCREEN RECOMMENDATIONS: OTHER

## 2022-09-01 PROCEDURE — OTHER SCREENING FOR COVID-19: OTHER

## 2022-09-01 PROCEDURE — OTHER COUNSELING: OTHER

## 2022-09-01 RX ORDER — MINOXIDIL 2.5 MG/1
TABLET ORAL
Qty: 30 | Refills: 2 | Status: ERX | COMMUNITY
Start: 2022-09-01

## 2022-09-01 ASSESSMENT — LOCATION DETAILED DESCRIPTION DERM
LOCATION DETAILED: RIGHT SUPERIOR FOREHEAD
LOCATION DETAILED: EPIGASTRIC SKIN
LOCATION DETAILED: RIGHT MEDIAL FRONTAL SCALP
LOCATION DETAILED: LEFT MEDIAL INFERIOR CHEST
LOCATION DETAILED: LEFT SUPERIOR FOREHEAD

## 2022-09-01 ASSESSMENT — LOCATION ZONE DERM
LOCATION ZONE: FACE
LOCATION ZONE: TRUNK
LOCATION ZONE: SCALP

## 2022-09-01 ASSESSMENT — LOCATION SIMPLE DESCRIPTION DERM
LOCATION SIMPLE: RIGHT FOREHEAD
LOCATION SIMPLE: LEFT FOREHEAD
LOCATION SIMPLE: RIGHT SCALP
LOCATION SIMPLE: ABDOMEN
LOCATION SIMPLE: CHEST

## 2022-09-01 NOTE — PROCEDURE: PRESCRIPTION MEDICATION MANAGEMENT
Render In Strict Bullet Format?: No
Detail Level: Zone
Plan: Take 1/2 pill po qd. Bp: 127/61
Initiate Treatment: Minoxidil

## 2022-09-28 ENCOUNTER — RX ONLY (RX ONLY)
Age: 52
End: 2022-09-28

## 2022-09-28 RX ORDER — MINOXIDIL 2.5 MG/1
TABLET ORAL
Qty: 60 | Refills: 3 | Status: ERX

## 2023-05-30 ENCOUNTER — RX ONLY (RX ONLY)
Age: 53
End: 2023-05-30

## 2023-05-30 RX ORDER — MINOXIDIL 2.5 MG/1
TABLET ORAL
Qty: 60 | Refills: 3 | Status: ERX

## 2023-09-05 ENCOUNTER — APPOINTMENT (OUTPATIENT)
Dept: URBAN - METROPOLITAN AREA CLINIC 203 | Age: 53
Setting detail: DERMATOLOGY
End: 2023-09-05

## 2023-09-05 DIAGNOSIS — L82.1 OTHER SEBORRHEIC KERATOSIS: ICD-10-CM

## 2023-09-05 DIAGNOSIS — L64.8 OTHER ANDROGENIC ALOPECIA: ICD-10-CM

## 2023-09-05 DIAGNOSIS — L57.8 OTHER SKIN CHANGES DUE TO CHRONIC EXPOSURE TO NONIONIZING RADIATION: ICD-10-CM

## 2023-09-05 PROCEDURE — OTHER PRESCRIPTION MEDICATION MANAGEMENT: OTHER

## 2023-09-05 PROCEDURE — OTHER COUNSELING: OTHER

## 2023-09-05 PROCEDURE — 99214 OFFICE O/P EST MOD 30 MIN: CPT

## 2023-09-05 PROCEDURE — OTHER PRESCRIPTION: OTHER

## 2023-09-05 PROCEDURE — OTHER REASSURANCE: OTHER

## 2023-09-05 RX ORDER — MINOXIDIL 10 MG/1
TABLET ORAL QD
Qty: 30 | Refills: 3 | Status: ERX | COMMUNITY
Start: 2023-09-05

## 2023-09-05 ASSESSMENT — LOCATION SIMPLE DESCRIPTION DERM
LOCATION SIMPLE: CHEST
LOCATION SIMPLE: RIGHT SCALP
LOCATION SIMPLE: ANTERIOR SCALP

## 2023-09-05 ASSESSMENT — LOCATION ZONE DERM
LOCATION ZONE: TRUNK
LOCATION ZONE: SCALP

## 2023-09-05 ASSESSMENT — LOCATION DETAILED DESCRIPTION DERM
LOCATION DETAILED: RIGHT MEDIAL FRONTAL SCALP
LOCATION DETAILED: LEFT MEDIAL INFERIOR CHEST
LOCATION DETAILED: MID-FRONTAL SCALP

## 2023-09-05 ASSESSMENT — PAIN INTENSITY VAS: HOW INTENSE IS YOUR PAIN 0 BEING NO PAIN, 10 BEING THE MOST SEVERE PAIN POSSIBLE?: NO PAIN

## 2023-09-05 NOTE — PROCEDURE: PRESCRIPTION MEDICATION MANAGEMENT
Initiate Treatment: Minoxidil 10mg half tab po qd
Render In Strict Bullet Format?: No
Detail Level: Zone

## 2024-07-03 ENCOUNTER — RX ONLY (RX ONLY)
Age: 54
End: 2024-07-03

## 2024-07-03 RX ORDER — MINOXIDIL 10 MG/1
TABLET ORAL QD
Qty: 30 | Refills: 0 | Status: ERX

## 2024-07-03 RX ORDER — MINOXIDIL 10 MG/1
TABLET ORAL QD
Qty: 30 | Refills: 3 | Status: ERX

## (undated) DEVICE — GAUZE 8X4 16 PLY RFID DOUBLE XRAY

## (undated) DEVICE — DRAPE LARGE REVERSE FOLD

## (undated) DEVICE — PRECISION 9.0 X .51 X 39.0MM

## (undated) DEVICE — MASTISOL LIQUID ADHESIVE

## (undated) DEVICE — NEEDLE DISP HYPO 25GX1-1/2IN

## (undated) DEVICE — GOWN SURGICAL REINFORCED X-LAR

## (undated) DEVICE — CUFF TOURNIQUET REPROCESSED 34IN

## (undated) DEVICE — DRAPE MINI C-ARM OEC FLUOROSCAN

## (undated) DEVICE — GLOVE SZ 8 PROTEXIS PI

## (undated) DEVICE — APPLICATOR CHLORAPREP 26ML ORANGE TINT

## (undated) DEVICE — DRESSING ADAPTIC STERILE 3X3

## (undated) DEVICE — SUTURE VICRYL PLUS 2-0 VCP869H

## (undated) DEVICE — BANDAGE COHESIVE 4IN

## (undated) DEVICE — CASTING ROLL FIBERGLAS 3IN

## (undated) DEVICE — DRESSING SPONGE GAUZE 4X4 STER

## (undated) DEVICE — NEEDLE HYPODERMIC PRO 18G X 1 1/2 IN

## (undated) DEVICE — SYRINGE DISP LUER-LOK 10 CC

## (undated) DEVICE — BLADE SCALPEL #15

## (undated) DEVICE — ***USE 57698*** SLEEVE FLOWTRON DVT CALF SINGLE USE

## (undated) DEVICE — WRAP COBAN LATEX FREE 4IN STERILE

## (undated) DEVICE — APPLICATOR CHLORAPREP 10.5ML ORANGE TINT

## (undated) DEVICE — ***USE 56917*** SUTURE ETHILON 4-0   1667H

## (undated) DEVICE — Device

## (undated) DEVICE — STAPLER SKIN ROTATING HEAD WIDE  PRW35

## (undated) DEVICE — CAST PADDING 3IN

## (undated) DEVICE — PACK RFID HAND

## (undated) DEVICE — PAD GROUND ELECTROSURGICAL W/CORD

## (undated) DEVICE — GLOVE SZ 8 LINER PROTEXIS PI BL

## (undated) DEVICE — MANIFOLD SINGLE PORT NEPTUNE

## (undated) DEVICE — BLADE X-LONG WIDE 39MM

## (undated) DEVICE — TUBE SUCTION 1/4INX20FT STERILE

## (undated) DEVICE — TUBING SMOKE EVAC PENCIL COATED

## (undated) DEVICE — COVER LIGHTHANDLE

## (undated) DEVICE — ***USE 56962*** SUTURE VICRYL 3-0  J497H

## (undated) DEVICE — SUTURE MONOCRYL 3-0  Y497G

## (undated) DEVICE — STERISTRIP 1/2INX4IN

## (undated) DEVICE — BANDAGE ACE XL 6 X 15

## (undated) DEVICE — BLANKET UPPER BODY BAIR HUGGER

## (undated) DEVICE — SOLN IRRIG .9%SOD 500ML

## (undated) DEVICE — DRAPE-U NON-STERILE

## (undated) DEVICE — GAUZE 4X4IN 12 PLY 200/PK

## (undated) DEVICE — SUTURE MONOCRYL 4-0  Y496G PS-2 I8IN